# Patient Record
Sex: FEMALE | Race: WHITE | NOT HISPANIC OR LATINO
[De-identification: names, ages, dates, MRNs, and addresses within clinical notes are randomized per-mention and may not be internally consistent; named-entity substitution may affect disease eponyms.]

---

## 2022-11-22 ENCOUNTER — APPOINTMENT (OUTPATIENT)
Dept: OBGYN | Facility: CLINIC | Age: 57
End: 2022-11-22

## 2022-11-22 ENCOUNTER — RESULT CHARGE (OUTPATIENT)
Age: 57
End: 2022-11-22

## 2022-11-22 VITALS — DIASTOLIC BLOOD PRESSURE: 75 MMHG | SYSTOLIC BLOOD PRESSURE: 142 MMHG

## 2022-11-22 DIAGNOSIS — Z00.00 ENCOUNTER FOR GENERAL ADULT MEDICAL EXAMINATION W/OUT ABNORMAL FINDINGS: ICD-10-CM

## 2022-11-22 LAB
BILIRUB UR QL STRIP: NORMAL
CLARITY UR: CLEAR
COLLECTION METHOD: NORMAL
DATE COLLECTED: NORMAL
GLUCOSE UR-MCNC: NORMAL
HCG UR QL: 0.2 EU/DL
HEMOCCULT SP1 STL QL: NEGATIVE
HEMOGLOBIN: 12.3
HGB UR QL STRIP.AUTO: NORMAL
KETONES UR-MCNC: NORMAL
LEUKOCYTE ESTERASE UR QL STRIP: NORMAL
NITRITE UR QL STRIP: NORMAL
PH UR STRIP: 7
PROT UR STRIP-MCNC: NORMAL
QUALITY CONTROL: YES
SP GR UR STRIP: 1.02

## 2022-11-22 PROCEDURE — 82270 OCCULT BLOOD FECES: CPT

## 2022-11-22 PROCEDURE — 85018 HEMOGLOBIN: CPT | Mod: QW

## 2022-11-22 PROCEDURE — 99386 PREV VISIT NEW AGE 40-64: CPT

## 2022-11-22 PROCEDURE — 81003 URINALYSIS AUTO W/O SCOPE: CPT | Mod: QW

## 2022-11-22 NOTE — PHYSICAL EXAM
[Chaperone Present] : A chaperone was present in the examining room during all aspects of the physical examination [Appropriately responsive] : appropriately responsive [Alert] : alert [No Lymphadenopathy] : no lymphadenopathy [Soft] : soft [Non-tender] : non-tender [Oriented x3] : oriented x3 [Examination Of The Breasts] : a normal appearance [No Discharge] : no discharge [No Masses] : no breast masses were palpable [Labia Majora] : normal [Labia Minora] : normal [Normal] : normal [Uterine Adnexae] : normal [Enlarged ___ wks] : enlarged [unfilled] ~Uweeks [FreeTextEntry1] : Linda CUEVA-BETTYE chaperoned during entire physical exam [Occult Blood Positive] : was negative for occult blood analysis [FreeTextEntry6] : mobile, bulky

## 2022-11-22 NOTE — HISTORY OF PRESENT ILLNESS
[FreeTextEntry1] : Patient is a 55 y/o female here today to establish care. She complains of continuous vaginal bleeding. She was sent by Dr. Jones. Patient states continuous bleeding has been ongoing since february. Her last period before feb was about 6 months prior. She states she did go 11 months in 2017 without  a period.\par She recently had a pelvic sonogram that revealed an enlarged uterus small fibroid, her endometrial lining was 3.7cm. \par Her last pap smear was 2012\par Last mammogram 2019\par She has no children and no previous pregnancies.

## 2022-11-22 NOTE — PLAN
[FreeTextEntry1] : \par \par Patient to follow up in 1 year for annual GYN exam\par \par \par Mammogram : now Rx given \par Colonoscopy due: \par Bone density due: will evaluate menopause with blood work. Will do FSH and estradiol, and follow up with results. \par Will follow up with patient on lab work results\par endometrial biopsy scheduled for 2 weeks.\par \par all of her questions were answered and is agreeable with plan.\par \par \par \par I Linda MOISE am scribing for the presence of Dr. Rosenberg the following sections HISTORY OF PRESENT ILLNESS, PAST MEDICAL/FAMILY/SOCIAL HISTORY; REVIEW OF SYSTEMS; VITAL SIGNS; PHYSICAL EXAM; DISPOSITION. \par \par \par I personally performed the services described in the documentation, reviewed the documentation recorded by the scribe in my presence and it accurately and completely records my words and actions.\par \par \par pap ordered\par \par Hemoccult ordered \par All questions answered, patient agreeable with plan.\par \par \par \par I Linda MOISE am scribing for the presence of Dr. Rosenberg the following sections HISTORY OF PRESENT ILLNESS, PAST MEDICAL/FAMILY/SOCIAL HISTORY; REVIEW OF SYSTEMS; VITAL SIGNS; PHYSICAL EXAM; DISPOSITION. \par \par \par I personally performed the services described in the documentation, reviewed the documentation recorded by the scribe in my presence and it accurately and completely records my words and actions.\par \par \par \par \par CHRIS MOISE am scribing for the presence of Dr. Rosenberg the following sections HISTORY OF PRESENT ILLNESS, PAST MEDICAL/FAMILY/SOCIAL HISTORY; REVIEW OF SYSTEMS; VITAL SIGNS; PHYSICAL EXAM; DISPOSITION. \par \par \par I personally performed the services described in the documentation, reviewed the documentation recorded by the scribe in my presence and it accurately and completely records my words and actions.\par

## 2022-11-29 ENCOUNTER — NON-APPOINTMENT (OUTPATIENT)
Age: 57
End: 2022-11-29

## 2022-11-29 LAB
ESTRADIOL SERPL-MCNC: 36 PG/ML
FSH SERPL-MCNC: 11 IU/L

## 2022-12-01 LAB — CYTOLOGY CVX/VAG DOC THIN PREP: ABNORMAL

## 2022-12-09 ENCOUNTER — APPOINTMENT (OUTPATIENT)
Dept: OBGYN | Facility: CLINIC | Age: 57
End: 2022-12-09

## 2022-12-12 ENCOUNTER — APPOINTMENT (OUTPATIENT)
Dept: OBGYN | Facility: CLINIC | Age: 57
End: 2022-12-12

## 2022-12-12 PROCEDURE — 58100 BIOPSY OF UTERUS LINING: CPT

## 2022-12-12 NOTE — PROCEDURE
[Endometrial Biopsy] : Endometrial biopsy [Irregular Bleeding] : irregular uterine bleeding [Thickened Endometrium] : thickened endometrium [Risks] : risks [Benefits] : benefits [Patient] : patient [Infection] : infection [Bleeding] : bleeding [No Premedication] : No premedication [Tenaculum] : Tenaculum [Required Dilation] : required dilation [Scant] : scant [Sent to Pathology] : placed in buffered formalin and sent for pathology [Tolerated Well] : Patient tolerated the procedure well [No Complications] : No complications

## 2022-12-19 NOTE — ASSESSMENT
[FreeTextEntry1] : patient is a 58 y/o female here today for endometrial biopsy for abnormal uterine bleeding. She tolerated well. minimal cramping and minimal bleeding

## 2022-12-19 NOTE — PLAN
[FreeTextEntry1] : \par no restrictions at this time.\par patient to call me if she has any heavy bleeding or cramping that does not resolve with NSAIDS.\par will f/u with her on results.\par all of her questions were answered she is agreeable with plan\par \par \par \par \par CHRIS WOLFC am scribing for the presence of Dr. Rosenberg the following sections HISTORY OF PRESENT ILLNESS, PAST MEDICAL/FAMILY/SOCIAL HISTORY; REVIEW OF SYSTEMS; VITAL SIGNS; PHYSICAL EXAM; DISPOSITION. \par \par \par I personally performed the services described in the documentation, reviewed the documentation recorded by the scribe in my presence and it accurately and completely records my words and actions.\par

## 2022-12-22 ENCOUNTER — NON-APPOINTMENT (OUTPATIENT)
Age: 57
End: 2022-12-22

## 2022-12-27 ENCOUNTER — NON-APPOINTMENT (OUTPATIENT)
Age: 57
End: 2022-12-27

## 2022-12-29 LAB — CORE LAB BIOPSY: NORMAL

## 2023-01-11 ENCOUNTER — APPOINTMENT (OUTPATIENT)
Dept: GYNECOLOGIC ONCOLOGY | Facility: CLINIC | Age: 58
End: 2023-01-11
Payer: COMMERCIAL

## 2023-01-11 VITALS — HEIGHT: 64 IN | WEIGHT: 279 LBS | BODY MASS INDEX: 47.63 KG/M2

## 2023-01-11 DIAGNOSIS — I10 ESSENTIAL (PRIMARY) HYPERTENSION: ICD-10-CM

## 2023-01-11 PROCEDURE — 99204 OFFICE O/P NEW MOD 45 MIN: CPT

## 2023-01-11 RX ORDER — VALSARTAN AND HYDROCHLOROTHIAZIDE 320; 25 MG/1; MG/1
320-25 TABLET, FILM COATED ORAL
Refills: 0 | Status: ACTIVE | COMMUNITY

## 2023-01-12 NOTE — END OF VISIT
[FreeTextEntry3] : Written by Chasidy Scott PA-C, acting as scribe for Dr. Teofilo Rios.\par  [FreeTextEntry2] : This note accurately reflects the work and decisions made by me.\par

## 2023-01-12 NOTE — PHYSICAL EXAM
[Chaperone Present] : A chaperone was present in the examining room during all aspects of the physical examination [Normal] : Bimanual Exam: Normal [FreeTextEntry1] : Maryjo Scott PA-C [de-identified] : polyp protruding through cervix [Restricted in physically strenuous activity but ambulatory and able to carry out work of a light or sedentary nature] : Status 1- Restricted in physically strenuous activity but ambulatory and able to carry out work of a light or sedentary nature, e.g., light house work, office work

## 2023-01-12 NOTE — ASSESSMENT
[FreeTextEntry1] : 58yo with endometrial carcinoma, endometrioid type FIGO 1 diagnosed via EMB.\par \par I discussed with patient that based off biopsy, her type of cancer is likely not aggressive and can be cured with surgery alone about 80% of the time. 15% of the time it requires RT and 5% require chemotherapy which will not be known until we are able to review final pathology. Patient understands that the gold standard for treatment is a hysterectomy. \par \par I discussed at length with the patient the nature, purpose, risks, benefits, and alternatives of Robot assisted total laparoscopic hysterectomy with bilateral salpingo-oophorectomy, sentinel lymph node mapping with dissection and possible mini laparotomy.  The patient understands the risks to include,but not be limited to, bowel injury, bleeding (with the possible need for transfusion), bladder or ureteral injury, infections, deep venous thrombosis, and nick-operative death.  The patient also understands that her surgery may not be able to be performed robotically and that she may need a laparotomy.  She also understands the limitations of robotic surgery and the possibility of missing a surgical complication with need for subsequent re-exploration.  She agrees to proceed.  She asked numerous questions which were answered to her satisfaction.  She understands the need for a pre-operative bowel preparation and agrees to comply with our instructions.  She also understands the rationale for a cystoscopy at the completion of the procedure and the potential risks of cystoscopy.  The patient also understands the possible limitations of robotic staging compared to a laparotomy approach.\par \par

## 2023-01-12 NOTE — REVIEW OF SYSTEMS
[Negative] : Musculoskeletal [Abn Vag Bleeding] : abnormal vaginal bleeding [Hematuria] : no hematuria [Dysuria] : no dysuria [Vaginal Discharge] : no vaginal discharge [Dyspareunia] : no dyspareunia [Incontinence] : no incontinence [FreeTextEntry4] : pelvic cramping

## 2023-01-12 NOTE — CHIEF COMPLAINT
[FreeTextEntry1] : [place] [facility]\par \par [facility] [phone] at [address] [place] [address]\par

## 2023-01-12 NOTE — HISTORY OF PRESENT ILLNESS
[FreeTextEntry1] : 58 yo nulligravida LMP unknown due to AUB since February 2022. Patient reports daily bleeding since, alternating between heavy and light. She reports passage of blood clots that began in November. She has never gone one full year without periods. US performed 9/8/22 revealed 3.7 cm heterogenous endometrial lining, normal left ovary and right ovary not seen. Uterine size ~[id number]'s. EMB performed 12/12/22 revealed endometrial carcinoma, Endometrioid type FIGO 1. Patient admits to pelvic cramping. She denies issues with bowel/bladder habits\par \par LPAP- nov 2022, negative\par LMammo- Dec 2022, normal per pt\par LColonoscopy- 2020, normal per pt\par

## 2023-01-22 ENCOUNTER — TRANSCRIPTION ENCOUNTER (OUTPATIENT)
Age: 58
End: 2023-01-22

## 2023-01-25 ENCOUNTER — FORM ENCOUNTER (OUTPATIENT)
Age: 58
End: 2023-01-25

## 2023-01-26 ENCOUNTER — RESULT REVIEW (OUTPATIENT)
Age: 58
End: 2023-01-26

## 2023-02-01 ENCOUNTER — NON-APPOINTMENT (OUTPATIENT)
Age: 58
End: 2023-02-01

## 2023-02-15 ENCOUNTER — APPOINTMENT (OUTPATIENT)
Dept: GYNECOLOGIC ONCOLOGY | Facility: CLINIC | Age: 58
End: 2023-02-15
Payer: COMMERCIAL

## 2023-02-15 VITALS — WEIGHT: 279 LBS | BODY MASS INDEX: 47.63 KG/M2 | HEIGHT: 64 IN

## 2023-02-15 PROCEDURE — 99024 POSTOP FOLLOW-UP VISIT: CPT

## 2023-02-15 NOTE — ASSESSMENT
[FreeTextEntry1] : 56 yo with Stage Ia endometrioid adenocarcinoma, grade 2, no LVI, negative lymph nodes, 30$% invasion, lower uterine segment involvement s/p TRH, BSO,SLND on 01/26/2023. Recovering well. Recommended discussion with Radiation oncology regarding vaginal brachytherapy. She has only 1 risk factor (grade 2) but lower uterine segment involvement could be considered a risk factor so a discussion is warranted for vaginal brachytherapy.

## 2023-02-15 NOTE — DISCUSSION/SUMMARY
[Clean] : was clean [Dry] : was dry [Intact] : was intact [Erythema] : was not erythematous [Ecchymosis] : was not ecchymotic [Seroma] : had no seroma [None] : had no drainage [Normal Skin] : normal appearance [Firm] : soft [Tender] : nontender [Abnormal Bowel Sounds] : normal bowel sounds [Rebound] : no rebound tenderness [Guarding] : no guarding [Incisional Hernia] : no incisional hernia [Mass] : no palpable mass [Doing Well] : is doing well [Excellent Pain Control] : has excellent pain control [No Sign of Infection] : is showing no signs of infection [FreeTextEntry1] : Pertinent Findings: Normal uterus sounded to 6 cm due to stenosis, normal bilateral ovaries, fallopian tubes. \par Bilateral external iliac sentinel lymph node. Extensive peritoneal implants in pelvis and abdomen consistent with \par extensive fluffy endometriosis. No enlarged lymph nodes seen. Prominent bulge at the sacrum at the area of aortic \par bifurcation, concern for aneurism. Cystoscopy: normal bilateral ureteral jets, no injury to the bladder.\par \par Surgical Pathology Report\par Diagnosis\par A. Endocervical polyp, polypectomy:\par *   Benign endocervical polyp.\par \par B. Uterus cervix bilateral tubes and ovaries, hysterectomy and bilateral salpingectomy:\par *  Endometrial endometrioid adenocarcinoma, FIGO grade II with extension to the lower\par uterine segment.\par \par *  Percentage of myometrial invasion: Less than 50% (estimated to be 31%).\par *  Leiomyoma.\par *  Cervix with squamous metaplasia.\par *  Bilateral ovaries with cystic follicles.\par *  Bilateral fallopian tubes with no significant pathologic diagnoses.\par *  Pathologic Stage Classification: pT1a pN0(sn)\par \par C. Bladder peritoneal, biopsy:\par *   Mesothelium-lined fibroadipose tissue with acute and chronic inflammation and associated\par reactive changes.\par \par D. Right external iliac, excision:\par *   One benign lymph node, (0/1).\par *   Negative for metastatic carcinoma.\par \par E. Left external iliac, excision:\par *   One benign lymph node, (0/1).\par *   Negative for metastatic carcinoma.\par \par

## 2023-02-15 NOTE — REASON FOR VISIT
[Post Op] : post op visit [de-identified] : 01/26/2023 [de-identified] :  Robotic assisted laparoscopic hysterectomy, BSO,SLND, Uterosacral ligament fixation and Cystoscopy for endometrial carcinoma at [facility] [de-identified] : Denies any SOB, abnormal pain or VB. Bowel and bladder function are wnl. Patient states she feels well from a gynecological stand point.

## 2023-02-23 ENCOUNTER — OUTPATIENT (OUTPATIENT)
Dept: OUTPATIENT SERVICES | Facility: HOSPITAL | Age: 58
LOS: 1 days | Discharge: HOME | End: 2023-02-23
Payer: COMMERCIAL

## 2023-02-24 ENCOUNTER — APPOINTMENT (OUTPATIENT)
Dept: RADIATION ONCOLOGY | Facility: CLINIC | Age: 58
End: 2023-02-24
Payer: COMMERCIAL

## 2023-02-24 VITALS
WEIGHT: 266 LBS | SYSTOLIC BLOOD PRESSURE: 149 MMHG | HEART RATE: 93 BPM | OXYGEN SATURATION: 98 % | DIASTOLIC BLOOD PRESSURE: 80 MMHG | BODY MASS INDEX: 45.66 KG/M2 | RESPIRATION RATE: 16 BRPM

## 2023-02-24 DIAGNOSIS — Z80.0 FAMILY HISTORY OF MALIGNANT NEOPLASM OF DIGESTIVE ORGANS: ICD-10-CM

## 2023-02-24 DIAGNOSIS — N93.9 ABNORMAL UTERINE AND VAGINAL BLEEDING, UNSPECIFIED: ICD-10-CM

## 2023-02-24 DIAGNOSIS — Z87.891 PERSONAL HISTORY OF NICOTINE DEPENDENCE: ICD-10-CM

## 2023-02-24 DIAGNOSIS — Z80.1 FAMILY HISTORY OF MALIGNANT NEOPLASM OF TRACHEA, BRONCHUS AND LUNG: ICD-10-CM

## 2023-02-24 DIAGNOSIS — Z78.9 OTHER SPECIFIED HEALTH STATUS: ICD-10-CM

## 2023-02-24 PROCEDURE — 99204 OFFICE O/P NEW MOD 45 MIN: CPT | Mod: 25

## 2023-02-24 NOTE — REASON FOR VISIT
[Consideration of Curative Therapy] : consideration of curative therapy for [Endometrial Cancer] : endometrial cancer [Friend] : friend [Other: _____] : [unfilled]

## 2023-02-24 NOTE — REVIEW OF SYSTEMS
[Negative] : Allergic/Immunologic [Constipation: Grade 0] : Constipation: Grade 0 [Diarrhea: Grade 0] : Diarrhea: Grade 0 [Edema Limbs: Grade 0] : Edema Limbs: Grade 0  [Fatigue: Grade 0] : Fatigue: Grade 0 [Localized Edema: Grade 0] : Localized Edema: Grade 0  [Neck Edema: Grade 0] : Neck Edema: Grade 0 [Urinary Incontinence: Grade 1 - Occasional (e.g., with coughing, sneezing, etc.), pads not indicated] : Urinary Incontinence: Grade 1 - Occasional (e.g., with coughing, sneezing, etc.), pads not indicated [Dyspareunia: Grade 0] : Dyspareunia: Grade 0

## 2023-02-25 PROBLEM — Z80.0 FAMILY HISTORY OF MALIGNANT NEOPLASM OF COLON: Status: ACTIVE | Noted: 2023-01-11

## 2023-02-25 PROBLEM — N93.9 ABNORMAL UTERINE BLEEDING (AUB): Status: RESOLVED | Noted: 2022-11-22 | Resolved: 2023-02-20

## 2023-02-25 PROBLEM — Z80.1 FAMILY HISTORY OF LUNG CANCER: Status: ACTIVE | Noted: 2023-02-24

## 2023-02-25 PROBLEM — Z78.9 SOCIAL ALCOHOL USE: Status: ACTIVE | Noted: 2023-01-11

## 2023-02-25 PROBLEM — Z87.891 FORMER SMOKER: Status: ACTIVE | Noted: 2023-01-11

## 2023-02-25 NOTE — HISTORY OF PRESENT ILLNESS
[FreeTextEntry1] : 57 year old woman presents today for consultation regarding radiation therapy for endometrial carcinoma.\par She met with Dr. Varsha Jones (PCP) for post menopausal vaginal bleeding.\par 9/8/22 pelvic US:\par IMPRESSION:\par 3.7 cm, heterogeneous endometrial lining. Follow-up is recommended.\par Normal left ovary. Right ovary not seen.\par \par She met with Dr. Brett Rosenberg 11/22/22 for vaginal bleeding which began February 2022\par pap:\par negative for malignancy\par \par 11/29/22 mammogram:\par IMPRESSION:\par No mammographic evidence of malignancy. Annual screening mammography\par recommended.\par ASSESSMENT -- BI-RADS 2 BENIGN\par \par \par 12/29/22 endometrium biopsy:\par endometrioid carcinoma FIGO grade 1 \par \par SHe met with Dr Teofilo Rios 1/11/23\par \par 1/26/23  Robotic assisted laparoscopic hysterectomy, BSO,SLND, Uterosacral ligament fixation and Cystoscopy \par Surgical Pathology Report\par Diagnosis\par A. Endocervical polyp, polypectomy:\par *   Benign endocervical polyp.\par B. Uterus cervix bilateral tubes and ovaries, hysterectomy and bilateral salpingectomy:\par *  Endometrial endometrioid adenocarcinoma, FIGO grade II with extension to the lower uterine segment.\par *  Percentage of myometrial invasion: Less than 50% (estimated to be 31%).\par *  Leiomyoma.\par *  Cervix with squamous metaplasia.\par *  Bilateral ovaries with cystic follicles.\par *  Bilateral fallopian tubes with no significant pathologic diagnoses.\par *  Pathologic Stage Classification: pT1a pN0(sn)\par C. Bladder peritoneal, biopsy:\par *   Mesothelium-lined fibroadipose tissue with acute and chronic inflammation and associated reactive changes.\par D. Right external iliac, excision:\par *   One benign lymph node, (0/1).\par *   Negative for metastatic carcinoma.\par E. Left external iliac, excision:\par *   One benign lymph node, (0/1).\par *   Negative for metastatic carcinoma.\par Reports urinary stress incontinence post operatively & pelvic pulling sensation \par Denies vaginal pruritus, vaginal discharge, edema, change in bowel, fatigue & weight loss\par She is not sexually active at this time\par \par Dr Varsha Jones\par Dr. Teofilo Rios\par Dr. Brett Rosenberg\par

## 2023-02-25 NOTE — LETTER GREETING
[Dear Doctor] : Dear Doctor, [Consult Letter:] : Your patient, [unfilled] was seen in my office today for consultation. [Please see my note below.] : Please see my note below. [FreeTextEntry2] : Dr Varsha Jones\par Dr. Teofilo Rios\par Dr. Brett Rosenberg

## 2023-02-25 NOTE — VITALS
[Maximal Pain Intensity: 0/10] : 0/10 [Least Pain Intensity: 0/10] : 0/10 [90: Able to carry normal activity; minor signs or symptoms of disease.] : 90: Able to carry normal activity; minor signs or symptoms of disease.  [Date: ____________] : Patient's last distress assessment performed on [unfilled]. [0 - No Distress] : Distress Level: 0 [Patient given social work contact information and resource sheet] : Patient was given social work contact information and resource sheet [ECOG Performance Status: 1 - Restricted in physically strenuous activity but ambulatory and able to carry out work of a light or sedentary nature] : Performance Status: 1 - Restricted in physically strenuous activity but ambulatory and able to carry out work of a light or sedentary nature, e.g., light house work, office work

## 2023-02-25 NOTE — LETTER CLOSING
[Consult Closing:] : Thank you for allowing me to participate in the care of this patient.  If you have any questions, please do not hesitate to contact me. [Sincerely yours,] : Sincerely yours, [FreeTextEntry3] : Brett Herrera MD\par Physician in Chief\par Department of Radiation Medicine\par [facility]\par [facility] [place]\par \par  of Radiation Medicine\par [facility]\par at  [facility]\par \par Radiation \par [facility]/\par [facility] at [place]\par [address]\par [place], [place] [address]\par \par Tel: [phone]\par Fax: [phone]\par

## 2023-02-25 NOTE — DISEASE MANAGEMENT
[Pathological] : TNM Stage: p [IA] : IA [FreeTextEntry4] : adenocarcinoma endometrium [TTNM] : 1a [NTNM] : 0 [MTNM] : x [de-identified] : 4413cGy

## 2023-03-08 ENCOUNTER — NON-APPOINTMENT (OUTPATIENT)
Age: 58
End: 2023-03-08

## 2023-03-08 ENCOUNTER — APPOINTMENT (OUTPATIENT)
Dept: GYNECOLOGIC ONCOLOGY | Facility: CLINIC | Age: 58
End: 2023-03-08
Payer: COMMERCIAL

## 2023-03-08 ENCOUNTER — FORM ENCOUNTER (OUTPATIENT)
Age: 58
End: 2023-03-08

## 2023-03-08 PROCEDURE — 99024 POSTOP FOLLOW-UP VISIT: CPT

## 2023-03-08 NOTE — END OF VISIT
[FreeTextEntry3] : Proceed with vaginal radiation with Dr. Herrera\par Follow up in 3 months for surveillance [FreeTextEntry2] : Marina Beavers MA was present the entire duration of the patient interaction and gynecological exam.\par

## 2023-03-08 NOTE — DISCUSSION/SUMMARY
[Clean] : was clean [Dry] : was dry [Intact] : was intact [Erythema] : was not erythematous [Ecchymosis] : was not ecchymotic [Seroma] : had no seroma [None] : had no drainage [Normal Skin] : normal appearance [Firm] : soft [Tender] : nontender [Abnormal Bowel Sounds] : normal bowel sounds [Rebound] : no rebound tenderness [Guarding] : no guarding [Incisional Hernia] : no incisional hernia [Mass] : no palpable mass [Doing Well] : is doing well [Excellent Pain Control] : has excellent pain control [No Sign of Infection] : is showing no signs of infection [de-identified] : + vaginal sutures palpable, minimal discharge, cuff intact [FreeTextEntry1] : Pertinent Findings: Normal uterus sounded to 6 cm due to stenosis, normal bilateral ovaries, fallopian tubes. \par Bilateral external iliac sentinel lymph node. Extensive peritoneal implants in pelvis and abdomen consistent with \par extensive fluffy endometriosis. No enlarged lymph nodes seen. Prominent bulge at the sacrum at the area of aortic \par bifurcation, concern for aneurism. Cystoscopy: normal bilateral ureteral jets, no injury to the bladder.\par \par Surgical Pathology Report\par Diagnosis\par A. Endocervical polyp, polypectomy:\par * Benign endocervical polyp.\par \par B. Uterus cervix bilateral tubes and ovaries, hysterectomy and bilateral salpingectomy:\par * Endometrial endometrioid adenocarcinoma, FIGO grade II with extension to the lower\par uterine segment.\par \par * Percentage of myometrial invasion: Less than 50% (estimated to be 31%).\par * Leiomyoma.\par * Cervix with squamous metaplasia.\par * Bilateral ovaries with cystic follicles.\par * Bilateral fallopian tubes with no significant pathologic diagnoses.\par * Pathologic Stage Classification: pT1a pN0(sn)\par \par C. Bladder peritoneal, biopsy:\par * Mesothelium-lined fibroadipose tissue with acute and chronic inflammation and associated\par reactive changes.\par \par D. Right external iliac, excision:\par * One benign lymph node, (0/1).\par * Negative for metastatic carcinoma.\par \par E. Left external iliac, excision:\par * One benign lymph node, (0/1).\par * Negative for metastatic carcinoma.\par

## 2023-03-08 NOTE — ASSESSMENT
[FreeTextEntry1] : 58 yo with Stage Ia endometrioid adenocarcinoma, grade 2, no LVI, negative lymph nodes, 30$% invasion, lower uterine segment involvement s/p TRH, BSO,SLND on 01/26/2023. Recovering well. She has only 1 risk factor (grade 2) but lower uterine segment involvement could be considered a risk factor so a discussion is warranted for vaginal brachytherapy. \par

## 2023-03-08 NOTE — REASON FOR VISIT
[de-identified] : 01/26/2023 [de-identified] :  Robotic assisted laparoscopic hysterectomy, BSO,SLND, Uterosacral ligament fixation and Cystoscopy for endometrial carcinoma at [facility]  [de-identified] : Denies any SOB, abnormal pain or VB. Bowel and bladder function are wnl. Patient states she feels well from a gynecological stand point. Pt presents to office for a cuff check.

## 2023-03-16 PROCEDURE — 77290 THER RAD SIMULAJ FIELD CPLX: CPT | Mod: 26

## 2023-03-16 PROCEDURE — 77334 RADIATION TREATMENT AID(S): CPT | Mod: 26

## 2023-03-17 DIAGNOSIS — C54.1 MALIGNANT NEOPLASM OF ENDOMETRIUM: ICD-10-CM

## 2023-03-17 PROCEDURE — 77317 BRACHYTX ISODOSE INTERMED: CPT | Mod: 26

## 2023-03-17 PROCEDURE — 77334 RADIATION TREATMENT AID(S): CPT | Mod: 26

## 2023-03-20 PROCEDURE — 77332 RADIATION TREATMENT AID(S): CPT | Mod: 26

## 2023-03-20 PROCEDURE — 57156 INS VAG BRACHYTX DEVICE: CPT

## 2023-03-20 PROCEDURE — 77770 HDR RDNCL NTRSTL/ICAV BRCHTX: CPT | Mod: 26

## 2023-03-27 PROCEDURE — 77770 HDR RDNCL NTRSTL/ICAV BRCHTX: CPT | Mod: 26

## 2023-03-27 PROCEDURE — 57156 INS VAG BRACHYTX DEVICE: CPT

## 2023-03-27 PROCEDURE — 77332 RADIATION TREATMENT AID(S): CPT | Mod: 26

## 2023-04-03 PROCEDURE — 57156 INS VAG BRACHYTX DEVICE: CPT

## 2023-04-03 PROCEDURE — 77332 RADIATION TREATMENT AID(S): CPT | Mod: 26

## 2023-04-03 PROCEDURE — 77770 HDR RDNCL NTRSTL/ICAV BRCHTX: CPT | Mod: 26

## 2023-06-14 ENCOUNTER — APPOINTMENT (OUTPATIENT)
Dept: GYNECOLOGIC ONCOLOGY | Facility: CLINIC | Age: 58
End: 2023-06-14
Payer: COMMERCIAL

## 2023-06-14 ENCOUNTER — NON-APPOINTMENT (OUTPATIENT)
Age: 58
End: 2023-06-14

## 2023-06-14 VITALS — BODY MASS INDEX: 46.61 KG/M2 | WEIGHT: 273 LBS | HEIGHT: 64 IN

## 2023-06-14 PROCEDURE — 99214 OFFICE O/P EST MOD 30 MIN: CPT | Mod: 25

## 2023-06-14 PROCEDURE — 57100 BIOPSY VAGINAL MUCOSA SIMPLE: CPT | Mod: 59

## 2023-06-14 NOTE — PROCEDURE
[Vaginal Biopsy] : a vaginal biopsy [Other: ___] : [unfilled] [Silver Nitrate] : silver nitrate [FreeTextEntry1] : Biopsy done of vaginal cuff 0.5 cm papillary lesion consistent with granulation tissue grossly. No discharge.

## 2023-06-14 NOTE — ASSESSMENT
[FreeTextEntry1] : Pt is a 58 yo with Stage Ia endometrioid adenocarcinoma, grade 2, no LVI, negative lymph nodes, 30$% invasion, lower uterine segment involvement s/p TRH, BSO,SLND on 01/26/2023. Recovering well. She has only 1 risk factor (grade 2) but lower uterine segment involvement could be considered a risk factor so a discussion is warranted for vaginal brachytherapy.  Patient obtain vaginal brachytherapy from 03/20/2023-4/03/2023 for 2,100 cGy.\par \par Granulation tissue removed and sent to pathology. Will follow up on path results.\par \par Screening up to date. Will get mammogram later this year.

## 2023-06-14 NOTE — END OF VISIT
[FreeTextEntry3] : RTC in 6 months (3 months with Dr. Herrera) [FreeTextEntry2] : Marina Beavers MA was present the entire duration of the patient interaction and gynecological exam.\par

## 2023-06-14 NOTE — HISTORY OF PRESENT ILLNESS
[FreeTextEntry1] : 58 yo with Stage Ia endometrioid adenocarcinoma, grade 2, no LVI, negative lymph nodes, 30$% invasion, lower uterine segment involvement s/p TRH, BSO,SLND on 01/26/2023. Recovering well. She has only 1 risk factor (grade 2) but lower uterine segment involvement could be considered a risk factor so a discussion is warranted for vaginal brachytherapy.  Patient obtain vaginal brachytherapy from 03/20/2023-4/03/2023 for 2,100 cGy. Patient presents for a svl visit. \par \par Interval History: Patient reports not being sexually active by choice. She has no new bleeding or discharge, no new medical/surgical issues, no changes in medications. No new gynecologic complaints.\par \par [facility]:\par Mammogram: due, ordered by pcp \par Colonoscopy: few years ago, not due\par DEXA: age 64\par Vaccines: n/a\par

## 2023-06-14 NOTE — REASON FOR VISIT
[FreeTextEntry1] : [place] Location \par \[facility]. [phone]

## 2023-06-14 NOTE — PHYSICAL EXAM
[Chaperone Present] : A chaperone was present in the examining room during all aspects of the physical examination [FreeTextEntry1] : Marina Beavers MA was present the entire duration of the patient interaction and gynecological exam. [Absent] : Adnexa(ae): Absent [Normal] : Anus and perineum: Normal sphincter tone, no masses, no prolapse. [de-identified] : well healed upper vagina, granulation tissue at cuff, radiation changes on posterior mid vagina [Restricted in physically strenuous activity but ambulatory and able to carry out work of a light or sedentary nature] : Status 1- Restricted in physically strenuous activity but ambulatory and able to carry out work of a light or sedentary nature, e.g., light house work, office work

## 2023-06-20 LAB — CORE LAB BIOPSY: NORMAL

## 2023-07-06 ENCOUNTER — APPOINTMENT (OUTPATIENT)
Dept: RADIATION ONCOLOGY | Facility: CLINIC | Age: 58
End: 2023-07-06

## 2023-09-06 ENCOUNTER — APPOINTMENT (OUTPATIENT)
Dept: RADIATION ONCOLOGY | Facility: CLINIC | Age: 58
End: 2023-09-06
Payer: COMMERCIAL

## 2023-09-06 VITALS
WEIGHT: 275 LBS | HEART RATE: 101 BPM | DIASTOLIC BLOOD PRESSURE: 82 MMHG | RESPIRATION RATE: 16 BRPM | SYSTOLIC BLOOD PRESSURE: 133 MMHG | OXYGEN SATURATION: 96 % | BODY MASS INDEX: 47.2 KG/M2

## 2023-09-06 DIAGNOSIS — Z90.710 ACQUIRED ABSENCE OF BOTH CERVIX AND UTERUS: ICD-10-CM

## 2023-09-06 DIAGNOSIS — Z92.3 PERSONAL HISTORY OF IRRADIATION: ICD-10-CM

## 2023-09-06 PROCEDURE — 99213 OFFICE O/P EST LOW 20 MIN: CPT

## 2023-09-08 PROBLEM — Z90.710 HISTORY OF HYSTERECTOMY FOR CANCER: Status: ACTIVE | Noted: 2023-02-25

## 2023-09-08 PROBLEM — Z92.3 PERSONAL HISTORY OF RADIATION THERAPY: Status: ACTIVE | Noted: 2023-09-08

## 2023-09-08 NOTE — LETTER CLOSING
[Sincerely yours,] : Sincerely yours, [FreeTextEntry3] : Brett Herrera MD Physician in Chief Department of Radiation Medicine [facility]   of Radiation Medicine [employer] at  [facility]  Radiation  [facility] at [place] [address]  Tel: [phone] Fax: [phone]

## 2023-09-08 NOTE — PHYSICAL EXAM
[Obese] : obese [Normal] : oriented to person, place and time, the affect was normal, the mood was normal and not anxious [de-identified] : vaginal cuff intact . Biopsy site (granulomatos tissue?) seen at center of apex.

## 2023-09-08 NOTE — HISTORY OF PRESENT ILLNESS
[FreeTextEntry1] : The patient is a 58 y/o woman with few comorbidities recently diagnosed with a Stage I endometrioid adenocarcinoma of the uterus, s/p RA TLH/BSO.  Given involvement of the lower uterine segment, she is at increased risk of recurrence in the vaginal cuff. She completed radiation brachytherapy to the vaginal cuff on 4/3/2023.  notes having a vaginal cuff biopsy in June --benign.  Denies urinary symptoms or  issues with BMs  or vaginal discharge, bleeding, or discomfort.

## 2023-09-08 NOTE — LETTER GREETING
[Dear Doctor] : Dear Doctor, [Follow-Up] : Your patient, [unfilled] was seen in my office today for follow-up [Please see my note below.] : Please see my note below. [FreeTextEntry2] : Teofilo Rios MD

## 2023-09-08 NOTE — DISEASE MANAGEMENT
[Pathological] : TNM Stage: p [IA] : IA [FreeTextEntry4] : endometrial adenocarcinoma  [TTNM] : 1a [NTNM] : 0 [MTNM] : x [de-identified] : 2100 cGy [de-identified] : vaginal cuff

## 2023-12-13 ENCOUNTER — APPOINTMENT (OUTPATIENT)
Dept: GYNECOLOGIC ONCOLOGY | Facility: CLINIC | Age: 58
End: 2023-12-13
Payer: COMMERCIAL

## 2023-12-13 VITALS — HEIGHT: 64 IN | WEIGHT: 269 LBS | BODY MASS INDEX: 45.93 KG/M2

## 2023-12-13 DIAGNOSIS — C54.1 MALIGNANT NEOPLASM OF ENDOMETRIUM: ICD-10-CM

## 2023-12-13 PROCEDURE — 99212 OFFICE O/P EST SF 10 MIN: CPT

## 2023-12-13 NOTE — HISTORY OF PRESENT ILLNESS
[FreeTextEntry1] : 59 yo with Stage Ia endometrioid adenocarcinoma, grade 2, no LVI, negative lymph nodes, 30$% invasion, lower uterine segment involvement s/p TRH, BSO,SLND on 01/26/2023. Recovering well. She has only 1 risk factor (grade 2) but lower uterine segment involvement could be considered a risk factor so a discussion is warranted for vaginal brachytherapy. Patient obtained vaginal brachytherapy from 03/20/2023-4/03/2023 for 2,100 cGy. Patient presents for a svl visit.   Interval History: Patient reports not being sexually active by choice. She has no new bleeding or discharge, no new medical/surgical issues, no changes in medications. No new gynecologic complaints.   Health Maintenance: Mammogram: due, ordered by pcp Colonoscopy: due 2024 DEXA: age 64 Vaccines: n/a

## 2023-12-13 NOTE — PHYSICAL EXAM
[Chaperone Present] : A chaperone was present in the examining room during all aspects of the physical examination [FreeTextEntry1] : Marina Beavers MA was present the entire duration of the patient interaction and gynecological exam. [Absent] : Adnexa(ae): Absent [Normal] : Anus and perineum: Normal sphincter tone, no masses, no prolapse. [de-identified] : + granulation tissue/radiation changes at vaginal cuff, no discharge, no concerning lesions [de-identified] : no pelvic nodularity, no masses [Fully active, able to carry on all pre-disease performance without restriction] : Status 0 - Fully active, able to carry on all pre-disease performance without restriction

## 2023-12-13 NOTE — END OF VISIT
[FreeTextEntry3] : RTC in 4-6 months for surveillance follow up with Dr. Herrera [FreeTextEntry2] : Marina Beavers MA was present the entire duration of the patient interaction and gynecological exam.

## 2023-12-13 NOTE — ASSESSMENT
[FreeTextEntry1] : 59 yo with Stage Ia endometrioid adenocarcinoma, grade 2, no LVI, negative lymph nodes, 30$% invasion, lower uterine segment involvement s/p TRH, BSO,SLND on 01/26/2023. Recovering well. She has only 1 risk factor (grade 2) but lower uterine segment involvement could be considered a risk factor so a discussion is warranted for vaginal brachytherapy. Patient obtained vaginal brachytherapy from 03/20/2023-4/03/2023 for 2,100 cGy.  Normal exam with small polypoid tissue at vaginal cuff consistent with granulation tissue/radiation change. No discharge or concerning findings. No imaging indicated.

## 2023-12-13 NOTE — REASON FOR VISIT
[FreeTextEntry1] : [facility]. [phone] [address] [place] [address]

## 2024-03-12 ENCOUNTER — APPOINTMENT (OUTPATIENT)
Dept: RADIATION ONCOLOGY | Facility: CLINIC | Age: 59
End: 2024-03-12

## 2024-04-10 ENCOUNTER — APPOINTMENT (OUTPATIENT)
Dept: GYNECOLOGIC ONCOLOGY | Facility: CLINIC | Age: 59
End: 2024-04-10